# Patient Record
Sex: MALE | Employment: OTHER | URBAN - METROPOLITAN AREA
[De-identification: names, ages, dates, MRNs, and addresses within clinical notes are randomized per-mention and may not be internally consistent; named-entity substitution may affect disease eponyms.]

---

## 2019-03-11 ENCOUNTER — OFFICE VISIT - HEALTHEAST (OUTPATIENT)
Dept: FAMILY MEDICINE | Facility: CLINIC | Age: 69
End: 2019-03-11

## 2019-03-11 ENCOUNTER — AMBULATORY - HEALTHEAST (OUTPATIENT)
Dept: PEDIATRICS | Facility: CLINIC | Age: 69
End: 2019-03-11

## 2019-03-11 DIAGNOSIS — M19.90 ARTHRITIS: ICD-10-CM

## 2019-03-11 DIAGNOSIS — Z12.5 SCREENING FOR MALIGNANT NEOPLASM OF PROSTATE: ICD-10-CM

## 2019-03-11 DIAGNOSIS — Z00.00 ROUTINE GENERAL MEDICAL EXAMINATION AT A HEALTH CARE FACILITY: ICD-10-CM

## 2019-03-11 DIAGNOSIS — Z13.220 ENCOUNTER FOR SCREENING FOR LIPOID DISORDERS: ICD-10-CM

## 2019-03-11 LAB
ANION GAP SERPL CALCULATED.3IONS-SCNC: 11 MMOL/L (ref 5–18)
BUN SERPL-MCNC: 21 MG/DL (ref 8–28)
CALCIUM SERPL-MCNC: 9.5 MG/DL (ref 8.5–10.5)
CHLORIDE BLD-SCNC: 108 MMOL/L (ref 98–107)
CHOLEST SERPL-MCNC: 169 MG/DL
CO2 SERPL-SCNC: 22 MMOL/L (ref 22–31)
CREAT SERPL-MCNC: 0.82 MG/DL (ref 0.7–1.3)
FASTING STATUS PATIENT QL REPORTED: YES
GFR SERPL CREATININE-BSD FRML MDRD: >60 ML/MIN/1.73M2
GLUCOSE BLD-MCNC: 104 MG/DL (ref 70–125)
HDLC SERPL-MCNC: 60 MG/DL
HGB BLD-MCNC: 15 G/DL (ref 14–18)
LDLC SERPL CALC-MCNC: 83 MG/DL
POTASSIUM BLD-SCNC: 4.1 MMOL/L (ref 3.5–5)
PSA SERPL-MCNC: 1.5 NG/ML (ref 0–6.5)
SODIUM SERPL-SCNC: 141 MMOL/L (ref 136–145)
TRIGL SERPL-MCNC: 129 MG/DL

## 2019-03-11 ASSESSMENT — MIFFLIN-ST. JEOR: SCORE: 1653.47

## 2019-03-12 ENCOUNTER — COMMUNICATION - HEALTHEAST (OUTPATIENT)
Dept: PEDIATRICS | Facility: CLINIC | Age: 69
End: 2019-03-12

## 2019-04-01 ENCOUNTER — OFFICE VISIT - HEALTHEAST (OUTPATIENT)
Dept: FAMILY MEDICINE | Facility: CLINIC | Age: 69
End: 2019-04-01

## 2019-04-01 DIAGNOSIS — R05.9 COUGH: ICD-10-CM

## 2019-04-01 DIAGNOSIS — Z12.11 SCREEN FOR COLON CANCER: ICD-10-CM

## 2019-04-01 DIAGNOSIS — E78.5 HYPERLIPIDEMIA LDL GOAL <160: ICD-10-CM

## 2019-08-12 ENCOUNTER — COMMUNICATION - HEALTHEAST (OUTPATIENT)
Dept: FAMILY MEDICINE | Facility: CLINIC | Age: 69
End: 2019-08-12

## 2019-11-01 ENCOUNTER — COMMUNICATION - HEALTHEAST (OUTPATIENT)
Dept: FAMILY MEDICINE | Facility: CLINIC | Age: 69
End: 2019-11-01

## 2019-11-01 DIAGNOSIS — R05.9 COUGH: ICD-10-CM

## 2020-01-25 ENCOUNTER — COMMUNICATION - HEALTHEAST (OUTPATIENT)
Dept: FAMILY MEDICINE | Facility: CLINIC | Age: 70
End: 2020-01-25

## 2020-01-25 DIAGNOSIS — R05.9 COUGH: ICD-10-CM

## 2020-05-18 ENCOUNTER — COMMUNICATION - HEALTHEAST (OUTPATIENT)
Dept: FAMILY MEDICINE | Facility: CLINIC | Age: 70
End: 2020-05-18

## 2020-05-18 DIAGNOSIS — E78.5 HYPERLIPIDEMIA LDL GOAL <160: ICD-10-CM

## 2020-05-21 ENCOUNTER — COMMUNICATION - HEALTHEAST (OUTPATIENT)
Dept: FAMILY MEDICINE | Facility: CLINIC | Age: 70
End: 2020-05-21

## 2020-06-14 ENCOUNTER — COMMUNICATION - HEALTHEAST (OUTPATIENT)
Dept: FAMILY MEDICINE | Facility: CLINIC | Age: 70
End: 2020-06-14

## 2020-06-14 DIAGNOSIS — E78.5 HYPERLIPIDEMIA LDL GOAL <160: ICD-10-CM

## 2020-06-22 ENCOUNTER — OFFICE VISIT - HEALTHEAST (OUTPATIENT)
Dept: FAMILY MEDICINE | Facility: CLINIC | Age: 70
End: 2020-06-22

## 2020-06-22 DIAGNOSIS — R51.9 PRESSURE IN HEAD: ICD-10-CM

## 2020-06-22 DIAGNOSIS — E53.8 VITAMIN B12 DEFICIENCY (NON ANEMIC): ICD-10-CM

## 2020-06-22 DIAGNOSIS — E55.9 VITAMIN D INSUFFICIENCY: ICD-10-CM

## 2020-06-22 LAB
ANION GAP SERPL CALCULATED.3IONS-SCNC: 12 MMOL/L (ref 5–18)
BUN SERPL-MCNC: 17 MG/DL (ref 8–28)
CALCIUM SERPL-MCNC: 9.4 MG/DL (ref 8.5–10.5)
CHLORIDE BLD-SCNC: 108 MMOL/L (ref 98–107)
CO2 SERPL-SCNC: 22 MMOL/L (ref 22–31)
CREAT SERPL-MCNC: 0.91 MG/DL (ref 0.7–1.3)
GFR SERPL CREATININE-BSD FRML MDRD: >60 ML/MIN/1.73M2
GLUCOSE BLD-MCNC: 145 MG/DL (ref 70–125)
HGB BLD-MCNC: 14.6 G/DL (ref 14–18)
POTASSIUM BLD-SCNC: 4 MMOL/L (ref 3.5–5)
SODIUM SERPL-SCNC: 142 MMOL/L (ref 136–145)
VIT B12 SERPL-MCNC: 202 PG/ML (ref 213–816)

## 2020-06-23 LAB — 25(OH)D3 SERPL-MCNC: 21.1 NG/ML (ref 30–80)

## 2020-06-25 ENCOUNTER — COMMUNICATION - HEALTHEAST (OUTPATIENT)
Dept: FAMILY MEDICINE | Facility: CLINIC | Age: 70
End: 2020-06-25

## 2020-10-10 ENCOUNTER — COMMUNICATION - HEALTHEAST (OUTPATIENT)
Dept: FAMILY MEDICINE | Facility: CLINIC | Age: 70
End: 2020-10-10

## 2020-10-10 DIAGNOSIS — E53.8 VITAMIN B12 DEFICIENCY (NON ANEMIC): ICD-10-CM

## 2021-01-04 ENCOUNTER — COMMUNICATION - HEALTHEAST (OUTPATIENT)
Dept: FAMILY MEDICINE | Facility: CLINIC | Age: 71
End: 2021-01-04

## 2021-01-05 ENCOUNTER — COMMUNICATION - HEALTHEAST (OUTPATIENT)
Dept: SCHEDULING | Facility: CLINIC | Age: 71
End: 2021-01-05

## 2021-01-05 DIAGNOSIS — E78.5 HYPERLIPIDEMIA LDL GOAL <160: ICD-10-CM

## 2021-01-05 RX ORDER — PRAVASTATIN SODIUM 40 MG
40 TABLET ORAL DAILY
Qty: 90 TABLET | Refills: 1 | Status: SHIPPED | OUTPATIENT
Start: 2021-01-05 | End: 2021-07-18

## 2021-01-06 ENCOUNTER — OFFICE VISIT - HEALTHEAST (OUTPATIENT)
Dept: FAMILY MEDICINE | Facility: CLINIC | Age: 71
End: 2021-01-06

## 2021-01-06 DIAGNOSIS — Z12.11 SCREEN FOR COLON CANCER: ICD-10-CM

## 2021-01-06 DIAGNOSIS — E53.8 VITAMIN B12 DEFICIENCY (NON ANEMIC): ICD-10-CM

## 2021-01-06 DIAGNOSIS — E55.9 VITAMIN D INSUFFICIENCY: ICD-10-CM

## 2021-01-06 DIAGNOSIS — M17.10 ARTHRITIS OF KNEE: ICD-10-CM

## 2021-01-06 DIAGNOSIS — I10 BENIGN ESSENTIAL HYPERTENSION: ICD-10-CM

## 2021-01-20 ENCOUNTER — OFFICE VISIT - HEALTHEAST (OUTPATIENT)
Dept: FAMILY MEDICINE | Facility: CLINIC | Age: 71
End: 2021-01-20

## 2021-01-20 DIAGNOSIS — N52.9 ERECTILE DYSFUNCTION, UNSPECIFIED ERECTILE DYSFUNCTION TYPE: ICD-10-CM

## 2021-01-20 DIAGNOSIS — I10 BENIGN ESSENTIAL HYPERTENSION: ICD-10-CM

## 2021-01-20 DIAGNOSIS — Z12.11 SCREEN FOR COLON CANCER: ICD-10-CM

## 2021-01-27 ENCOUNTER — COMMUNICATION - HEALTHEAST (OUTPATIENT)
Dept: FAMILY MEDICINE | Facility: CLINIC | Age: 71
End: 2021-01-27

## 2021-01-27 DIAGNOSIS — I10 BENIGN ESSENTIAL HYPERTENSION: ICD-10-CM

## 2021-02-11 ENCOUNTER — COMMUNICATION - HEALTHEAST (OUTPATIENT)
Dept: FAMILY MEDICINE | Facility: CLINIC | Age: 71
End: 2021-02-11

## 2021-02-11 DIAGNOSIS — I10 BENIGN ESSENTIAL HYPERTENSION: ICD-10-CM

## 2021-02-11 RX ORDER — SILDENAFIL CITRATE 20 MG/1
20-40 TABLET ORAL DAILY
Qty: 180 TABLET | Refills: 3 | Status: SHIPPED | OUTPATIENT
Start: 2021-02-11

## 2021-02-17 ENCOUNTER — OFFICE VISIT - HEALTHEAST (OUTPATIENT)
Dept: FAMILY MEDICINE | Facility: CLINIC | Age: 71
End: 2021-02-17

## 2021-02-17 DIAGNOSIS — I10 BENIGN ESSENTIAL HYPERTENSION: ICD-10-CM

## 2021-02-17 DIAGNOSIS — Z12.11 SCREEN FOR COLON CANCER: ICD-10-CM

## 2021-02-17 RX ORDER — LOSARTAN POTASSIUM 50 MG/1
50 TABLET ORAL DAILY
Qty: 90 TABLET | Refills: 1 | Status: SHIPPED | OUTPATIENT
Start: 2021-02-17

## 2021-04-26 ENCOUNTER — AMBULATORY - HEALTHEAST (OUTPATIENT)
Dept: MULTI SPECIALTY CLINIC | Facility: CLINIC | Age: 71
End: 2021-04-26

## 2021-04-26 LAB — COLOGUARD-ABSTRACT: POSITIVE

## 2021-04-30 ENCOUNTER — COMMUNICATION - HEALTHEAST (OUTPATIENT)
Dept: ADMINISTRATIVE | Facility: CLINIC | Age: 71
End: 2021-04-30

## 2021-05-19 ENCOUNTER — RECORDS - HEALTHEAST (OUTPATIENT)
Dept: ADMINISTRATIVE | Facility: OTHER | Age: 71
End: 2021-05-19

## 2021-05-26 ENCOUNTER — RECORDS - HEALTHEAST (OUTPATIENT)
Dept: ADMINISTRATIVE | Facility: CLINIC | Age: 71
End: 2021-05-26

## 2021-05-27 NOTE — PROGRESS NOTES
Assessment:   1. Cough  Likely secondary to viral upper respiratory infection.   - benzonatate (TESSALON PERLES) 100 MG capsule; Take 1 capsule (100 mg total) by mouth every 6 (six) hours as needed for cough.  Dispense: 30 capsule; Refill: 0  - loratadine (CLARITIN) 10 mg tablet; Take 1 tablet (10 mg total) by mouth daily.  Dispense: 30 tablet; Refill: 2    2. Hyperlipidemia LDL goal <160  - pravastatin (PRAVACHOL) 40 MG tablet; Take 1 tablet (40 mg total) by mouth daily.  Dispense: 90 tablet; Refill: 3    3. Screen for colon cancer  - Ambulatory referral for Colonoscopy     Plan:      Explained lack of efficacy of antibiotics in viral disease.  Antitussives per medication orders.  Avoid exposure to tobacco smoke and fumes.  B-agonist inhaler.  Call if shortness of breath worsens, blood in sputum, change in character of cough, development of fever or chills, inability to maintain nutrition and hydration. Avoid exposure to tobacco smoke and fumes.  Trial of antihistamines.     Subjective:   Myron Pierce is a 68 y.o. male here for evaluation of a cough. Onset of symptoms was 3 weeks ago. Symptoms have been unchanged since that time. The cough is dry and is aggravated by cold air and reclining position. Associated symptoms include: no other symptoms. Patient does not have a history of asthma. Patient does not have a history of environmental allergens. Patient has not traveled recently. Patient does not have a history of smoking. Patient has not had a previous chest x-ray. Patient has not had a PPD done.    The following portions of the patient's history were reviewed and updated as appropriate: allergies, current medications, past family history, past medical history, past social history, past surgical history and problem list.    Review of Systems  Pertinent items are noted in HPI.      Objective:   Oxygen saturation 95% on room air  /82   Pulse 76   Temp 97.7  F (36.5  C)   Wt 209 lb (94.8 kg)   SpO2  95%   BMI 31.78 kg/m    General appearance: alert, appears stated age and cooperative  Head: Normocephalic, without obvious abnormality, atraumatic  Eyes: conjunctivae/corneas clear. PERRL, EOM's intact. Fundi benign.  Ears: normal TM's and external ear canals both ears  Nose: Nares normal. Septum midline. Mucosa normal. No drainage or sinus tenderness.  Throat: lips, mucosa, and tongue normal; teeth and gums normal  Neck: no adenopathy, no carotid bruit, no JVD, supple, symmetrical, trachea midline and thyroid not enlarged, symmetric, no tenderness/mass/nodules  Lungs: clear to auscultation bilaterally  Heart: regular rate and rhythm, S1, S2 normal, no murmur, click, rub or gallop  Pulses: 2+ and symmetric  Skin: Skin color, texture, turgor normal. No rashes or lesions  Lymph nodes: Cervical, supraclavicular, and axillary nodes normal.  Neurologic: Grossly normal

## 2021-06-02 VITALS — BODY MASS INDEX: 31.78 KG/M2 | WEIGHT: 209 LBS

## 2021-06-02 VITALS — HEIGHT: 68 IN | WEIGHT: 204.8 LBS | BODY MASS INDEX: 31.04 KG/M2

## 2021-06-02 NOTE — TELEPHONE ENCOUNTER
RN cannot approve Refill Request: benzonatate (TESSALON) 100 MG capsule    RN can NOT refill this medication med is not covered by policy/route to provider. Last office visit: 4/1/2019 Louise Penny FNP Last Physical: 3/11/2019 Last MTM visit: Visit date not found Last visit same specialty: 4/1/2019 Louise Penny FNP.  Next visit within 3 mo: Visit date not found  Next physical within 3 mo: Visit date not found    Refill Approved: loratadine (CLARITIN) 10 mg tablet    Rx renewed per Medication Renewal Policy. Medication was last renewed on 4/1/19.    Bettye Mckeon, [facility] Triage/Med Refill 11/2/2019     Requested Prescriptions   Pending Prescriptions Disp Refills     benzonatate (TESSALON) 100 MG capsule [Pharmacy Med Name: BENZONATATE 100 MG CAPSULE] 30 capsule 0     Sig: TAKE 1 CAPSULE (100 MG TOTAL) BY MOUTH EVERY 6 (SIX) HOURS AS NEEDED FOR COUGH.       There is no refill protocol information for this order        loratadine (CLARITIN) 10 mg tablet [Pharmacy Med Name: LORATADINE 10 MG TABLET] 30 tablet 2     Sig: TAKE 1 TABLET BY MOUTH EVERY DAY       Antihistamine Refill Protocol Passed - 11/1/2019  5:51 PM        Passed - Patient has had office visit/physical in last year     Last office visit with prescriber/PCP: 4/1/2019 Louise Penny FNP OR same dept: 4/1/2019 Louise Penny FNP OR same specialty: 4/1/2019 Louise Penny FNP  Last physical: 3/11/2019 Last MTM visit: Visit date not found   Next visit within 3 mo: Visit date not found  Next physical within 3 mo: Visit date not found  Prescriber OR PCP: REBECCA Corey  Last diagnosis associated with med order: 1. Cough  - benzonatate (TESSALON) 100 MG capsule [Pharmacy Med Name: BENZONATATE 100 MG CAPSULE]; Take 1 capsule (100 mg total) by mouth every 6 (six) hours as needed for cough.  Dispense: 30 capsule; Refill: 0  - loratadine (CLARITIN) 10 mg tablet [Pharmacy Med Name: LORATADINE 10 MG TABLET]; TAKE 1 TABLET BY  MOUTH EVERY DAY  Dispense: 30 tablet; Refill: 2    If protocol passes may refill for 12 months if within 3 months of last provider visit (or a total of 15 months).

## 2021-06-04 VITALS
HEART RATE: 80 BPM | DIASTOLIC BLOOD PRESSURE: 68 MMHG | WEIGHT: 210.3 LBS | SYSTOLIC BLOOD PRESSURE: 106 MMHG | BODY MASS INDEX: 31.98 KG/M2

## 2021-06-05 VITALS
SYSTOLIC BLOOD PRESSURE: 134 MMHG | DIASTOLIC BLOOD PRESSURE: 79 MMHG | WEIGHT: 209.31 LBS | OXYGEN SATURATION: 96 % | HEART RATE: 72 BPM | BODY MASS INDEX: 31.83 KG/M2

## 2021-06-05 VITALS
DIASTOLIC BLOOD PRESSURE: 79 MMHG | HEART RATE: 79 BPM | WEIGHT: 207.3 LBS | SYSTOLIC BLOOD PRESSURE: 124 MMHG | BODY MASS INDEX: 31.52 KG/M2

## 2021-06-05 VITALS
BODY MASS INDEX: 31.88 KG/M2 | SYSTOLIC BLOOD PRESSURE: 132 MMHG | WEIGHT: 209.7 LBS | DIASTOLIC BLOOD PRESSURE: 76 MMHG | HEART RATE: 78 BPM

## 2021-06-05 NOTE — TELEPHONE ENCOUNTER
Refill Approved    Rx renewed per Medication Renewal Policy. Medication was last renewed on 11/2/19.    Peg Barbosa, [facility] Triage/Med Refill 1/25/2020     Requested Prescriptions   Pending Prescriptions Disp Refills     loratadine (CLARITIN) 10 mg tablet [Pharmacy Med Name: LORATADINE 10 MG TABLET] 90 tablet 0     Sig: TAKE 1 TABLET BY MOUTH EVERY DAY       Antihistamine Refill Protocol Passed - 1/25/2020 12:59 AM        Passed - Patient has had office visit/physical in last year     Last office visit with prescriber/PCP: 4/1/2019 Louise Penny FNP OR same dept: 4/1/2019 Louise Penny FNP OR same specialty: 4/1/2019 Louise Penny FNP  Last physical: 3/11/2019 Last MTM visit: Visit date not found   Next visit within 3 mo: Visit date not found  Next physical within 3 mo: Visit date not found  Prescriber OR PCP: REBECCA Corey  Last diagnosis associated with med order: 1. Cough  - loratadine (CLARITIN) 10 mg tablet [Pharmacy Med Name: LORATADINE 10 MG TABLET]; TAKE 1 TABLET BY MOUTH EVERY DAY  Dispense: 90 tablet; Refill: 0    If protocol passes may refill for 12 months if within 3 months of last provider visit (or a total of 15 months).

## 2021-06-08 NOTE — TELEPHONE ENCOUNTER
RN cannot approve Refill Request    RN can NOT refill this medication PCP messaged that patient is overdue for Office Visit. Last office visit: 4/1/2019 Louise Penny FNP Last Physical: 3/11/2019 Last MTM visit: Visit date not found Last visit same specialty: 4/1/2019 Louise Penny FNP.  Next visit within 3 mo: Visit date not found  Next physical within 3 mo: Visit date not found      Lissett Wood, [facility] Triage/Med Refill 5/20/2020    Requested Prescriptions   Pending Prescriptions Disp Refills     pravastatin (PRAVACHOL) 40 MG tablet [Pharmacy Med Name: PRAVASTATIN SODIUM 40 MG TAB] 90 tablet 3     Sig: TAKE 1 TABLET BY MOUTH EVERY DAY       Statins Refill Protocol (Hmg CoA Reductase Inhibitors) Failed - 5/18/2020 12:30 AM        Failed - PCP or prescribing provider visit in past 12 months      Last office visit with prescriber/PCP: 4/1/2019 Louise Penny FNP OR same dept: Visit date not found OR same specialty: 4/1/2019 Louise Penny FNP  Last physical: 3/11/2019 Last MTM visit: Visit date not found   Next visit within 3 mo: Visit date not found  Next physical within 3 mo: Visit date not found  Prescriber OR PCP: REBECCA Corey  Last diagnosis associated with med order: 1. Hyperlipidemia LDL goal <160  - pravastatin (PRAVACHOL) 40 MG tablet [Pharmacy Med Name: PRAVASTATIN SODIUM 40 MG TAB]; TAKE 1 TABLET BY MOUTH EVERY DAY  Dispense: 90 tablet; Refill: 3    If protocol passes may refill for 12 months if within 3 months of last provider visit (or a total of 15 months).

## 2021-06-08 NOTE — TELEPHONE ENCOUNTER
RN cannot approve Refill Request    RN can NOT refill this medication Protocol failed and NO refill given.       Acacia Calderon, [facility] Triage/Med Refill 6/15/2020    Requested Prescriptions   Pending Prescriptions Disp Refills     pravastatin (PRAVACHOL) 40 MG tablet [Pharmacy Med Name: PRAVASTATIN SODIUM 40 MG TAB] 90 tablet 3     Sig: TAKE 1 TABLET BY MOUTH EVERY DAY       Statins Refill Protocol (Hmg CoA Reductase Inhibitors) Failed - 6/14/2020  8:31 AM        Failed - PCP or prescribing provider visit in past 12 months      Last office visit with prescriber/PCP: Visit date not found OR same dept: Visit date not found OR same specialty: 4/1/2019 Louise Penny, FNP  Last physical: Visit date not found Last MTM visit: Visit date not found   Next visit within 3 mo: Visit date not found  Next physical within 3 mo: Visit date not found  Prescriber OR PCP: Igor Coello MD  Last diagnosis associated with med order: 1. Hyperlipidemia LDL goal <160  - pravastatin (PRAVACHOL) 40 MG tablet [Pharmacy Med Name: PRAVASTATIN SODIUM 40 MG TAB]; TAKE 1 TABLET BY MOUTH EVERY DAY  Dispense: 30 tablet; Refill: 0    If protocol passes may refill for 12 months if within 3 months of last provider visit (or a total of 15 months).

## 2021-06-09 NOTE — PROGRESS NOTES
Office Visit:   Myron Pierce   69 y.o. male    Date of Visit: 6/22/2020    Chief Complaint   Patient presents with     head pain     left lower, will go to the right side, not a headache        Assessment and Plan   1. Pressure in head  Close diagnosis and treatment plan with patient.  Recommended patient use warm compresses 2 times daily acetaminophen 1000 mg maximum of 2000 a day.  Recommend evaluating vitamin B12 level, vitamin D level, hemoglobin and basic metabolic profile.  Answered patient's questions.  Patient verbalized understanding.  - Vitamin B12  - Hemoglobin  - Basic Metabolic Panel    2. Vitamin D insufficiency  - Vitamin D, Total (25-Hydroxy)    The following high BMI interventions were performed this visit: encouragement to exercise and dietary management education, guidance, and counseling    No follow-ups on file.     History of Present Illness   This 69 y.o. old male patient with a history of osteoarthritis of the knee presented to clinic today with complaint of pressure at the back of his head.  He states the pain is irregular.  He states that he usually get the pain when he is stressed or worried.  He also states that he gets the pain when he does not get his usual 8 hours of sleep at night.  He denied any hearing difficulties or ear pain.  He denied any fainting or dizziness.  He denied any dizziness with change in position.    Review of Systems: A comprehensive review of systems was negative except as noted.     Medications, Allergies and Problem List   Reviewed and updated     Physical Exam   General Appearance:   Well groomed    /68   Pulse 80   Wt 210 lb 4.8 oz (95.4 kg)   BMI 31.98 kg/m    General appearance: alert, appears stated age and cooperative  Head: Normocephalic, without obvious abnormality, atraumatic  Eyes: conjunctivae/corneas clear. PERRL, EOM's intact. Fundi benign.  Ears: normal TM's and external ear canals both ears  Throat: lips, mucosa, and tongue normal;  teeth and gums normal  Lungs: clear to auscultation bilaterally  Heart: regular rate and rhythm, S1, S2 normal, no murmur, click, rub or gallop  Pulses: 2+ and symmetric  Skin: Skin color, texture, turgor normal. No rashes or lesions  Lymph nodes: Cervical, supraclavicular, and axillary nodes normal.  Neurologic: Grossly normal       Additional Information   Current Outpatient Medications   Medication Sig Dispense Refill     benzonatate (TESSALON) 100 MG capsule TAKE 1 CAPSULE (100 MG TOTAL) BY MOUTH EVERY 6 (SIX) HOURS AS NEEDED FOR COUGH. 30 capsule 0     cholecalciferol, vitamin D3, 400 unit Tab Take 400 Units by mouth daily.       loratadine (CLARITIN) 10 mg tablet Take 1 tablet (10 mg total) by mouth daily. 90 tablet 0     pravastatin (PRAVACHOL) 40 MG tablet TAKE 1 TABLET BY MOUTH EVERY DAY 90 tablet 3     No current facility-administered medications for this visit.      No Known Allergies  Social History     Tobacco Use     Smoking status: Never Smoker     Smokeless tobacco: Never Used   Substance Use Topics     Alcohol use: Yes     Alcohol/week: 6.0 standard drinks     Types: 6 Cans of beer per week     Drug use: No          Promise LAURENCE Penny, CNP

## 2021-06-12 NOTE — TELEPHONE ENCOUNTER
RN cannot approve Refill Request    RN can NOT refill this medication Protocol failed and NO refill given. Last office visit: 6/22/2020 Louise Penny FNP Last Physical: 3/11/2019 Last MTM visit: Visit date not found Last visit same specialty: 6/22/2020 Louise Penny FNP.  Next visit within 3 mo: Visit date not found  Next physical within 3 mo: Visit date not found      Acacia Calderon, [facility] Triage/Med Refill 10/12/2020    Requested Prescriptions   Pending Prescriptions Disp Refills     VITAMIN B-12 250 MCG tablet [Pharmacy Med Name: VITAMIN B-12 250 MCG TABLET] 60 tablet 3     Sig: TAKE 2 TABLETS (500 MCG TOTAL) BY MOUTH DAILY.       Cyanocobalamin (Vitamin B12)  Refill Protocol Failed - 10/10/2020  1:30 PM        Failed - CBC in last 12 months     Hemoglobin   Date Value Ref Range Status   06/22/2020 14.6 14.0 - 18.0 g/dL Final                Passed - PCP or prescribing provider visit in past 12 months       Last office visit with prescriber/PCP: 6/22/2020 Louise Penny FNP OR same dept: 6/22/2020 Louise Penny FNP OR same specialty: 6/22/2020 Louise Penny FNP Last physical: 3/11/2019 Last MTM visit: Visit date not found    Next visit within 3 mo: Visit date not found  Next physical within 3 mo: Visit date not found  Prescriber OR PCP: REBECCA Corey  Last diagnosis associated with med order: 1. Vitamin B12 deficiency (non anemic)  - VITAMIN B-12 250 MCG tablet [Pharmacy Med Name: VITAMIN B-12 250 MCG TABLET]; TAKE 2 TABLETS (500 MCG TOTAL) BY MOUTH DAILY.  Dispense: 60 tablet; Refill: 3               Passed - Vitamin B12 level in last 12 months     Vitamin B-12   Date Value Ref Range Status   06/22/2020 202 (L) 213 - 816 pg/mL Final

## 2021-06-14 NOTE — TELEPHONE ENCOUNTER
Refill Approved    Rx renewed per Medication Renewal Policy. Medication was last renewed on 6/16/2020.  6/22/2020 Last office visit    J[facility] Triage/Med Refill 1/5/2021     Requested Prescriptions   Pending Prescriptions Disp Refills     pravastatin (PRAVACHOL) 40 MG tablet 90 tablet 3     Sig: Take 1 tablet (40 mg total) by mouth daily.       Statins Refill Protocol (Hmg CoA Reductase Inhibitors) Passed - 1/5/2021 10:57 AM        Passed - PCP or prescribing provider visit in past 12 months      Last office visit with prescriber/PCP: Visit date not found OR same dept: Visit date not found OR same specialty: Visit date not found  Last physical: Visit date not found Last MTM visit: Visit date not found   Next visit within 3 mo: Visit date not found  Next physical within 3 mo: Visit date not found  Prescriber OR PCP: Domonique Mason RN  Last diagnosis associated with med order: 1. Hyperlipidemia LDL goal <160  - pravastatin (PRAVACHOL) 40 MG tablet; Take 1 tablet (40 mg total) by mouth daily.  Dispense: 90 tablet; Refill: 3    If protocol passes may refill for 12 months if within 3 months of last provider visit (or a total of 15 months).

## 2021-06-14 NOTE — PROGRESS NOTES
Assessment/Plan:   1. Benign essential hypertension  Discussed diagnosis and treatment plan. Recommend that patient take medication as prescribed and follow up in two weeks.    Dietary sodium restriction.  Regular aerobic exercise.  Check blood pressures 2 times daily and record.   - losartan (COZAAR) 25 MG tablet; Take 1 tablet (25 mg total) by mouth daily.  Dispense: 30 tablet; Refill: 0    2. Arthritis of knee  Discussed possible diagnosis and need for further evaluation. Referral placed for orthopedics  - Ambulatory referral to Orthopedics    3. Screen for colon cancer  - Ambulatory referral for Colonoscopy    4. Vitamin B12 deficiency (non anemic)  Restart Vitamin B12  - cyanocobalamin (VITAMIN B-12) 250 MCG tablet; Take 1 tablet (250 mcg total) by mouth daily.  Dispense: 90 tablet; Refill: 3    5. Vitamin D insufficiency  Restart Vitamin D and recheck in 6 weeks  - cholecalciferol, vitamin D3, 125 mcg (5,000 unit) capsule; Take 5 capsules (25,000 Units total) by mouth once a week. Take 5 capsules weekly  Dispense: 90 capsule; Refill: 3    Subjective:   Patient here for follow-up of elevated blood pressure.  He is concerned of behind the ear headaches that comes when he is stressed or exerting himself mentally. He reports that he does not experience the headache when he is relaxed or doing something fun. He also reports that he has noted elevated blood pressure at home. He reports that he attempts to exercise.  He adherent to a low-salt diet.  Blood pressure is not well controlled at home. Cardiac symptoms: none. Patient denies: chest pain, dyspnea, exertional chest pressure/discomfort, fatigue, irregular heart beat, lower extremity edema, orthopnea, paroxysmal nocturnal dyspnea and syncope. Cardiovascular risk factors: advanced age (older than 55 for men, 65 for women), dyslipidemia, hypertension and male gender. Use of agents associated with hypertension: none. History of target organ damage: none.      Patient reported that both of his knees is bothering him but especially the left knee.  He stated that it gives out on him most of the time and it looks inflamed.  He stated that its difficult for him to climb the stairs and to complete his ADLs.  Patient denied any recent falls or injuries.  Patient rates pain at a 6-7 scale of 1-10.  Patient would like to discuss further treatment for his knee pain.      25 minutes spent on the date of the encounter doing chart review, history and exam, documentation and further activities as noted above    The following portions of the patient's history were reviewed and updated as appropriate: allergies, current medications, past family history, past medical history, past social history, past surgical history and problem list.    Review of Systems  A 12 point comprehensive review of systems was negative except as noted.        Objective:   /79   Pulse 72   Wt 209 lb 5 oz (94.9 kg)   SpO2 96%   BMI 31.83 kg/m    General appearance: alert, appears stated age and cooperative  Head: Normocephalic, without obvious abnormality, atraumatic  Neck: no adenopathy, no carotid bruit, no JVD, supple, symmetrical, trachea midline and thyroid not enlarged, symmetric, no tenderness/mass/nodules  Lungs: clear to auscultation bilaterally  Heart: regular rate and rhythm, S1, S2 normal, no murmur, click, rub or gallop  Extremities: extremities normal, atraumatic, no cyanosis or edema  Pulses: 2+ and symmetric  Neurologic: Grossly normal          I spent a total of 15 minutes face-to-face with Myron Pierce during today's office visit.  Over 50% of this time was spent counseling the patient and/or coordinating care regarding elevated blood pressure and other symptoms. See note for details.

## 2021-06-14 NOTE — PROGRESS NOTES
Assessment:   1. Benign essential hypertension  Blood pressure is not meeting goal of less than 140/90 at home. Recommend increasing losartan to 50 mg daily and patient will follow up in 4 weeks  - losartan (COZAAR) 50 MG tablet; Take 1 tablet (50 mg total) by mouth daily.  Dispense: 30 tablet; Refill: 0    2. Erectile dysfunction, unspecified erectile dysfunction type  Discussed diagnosis and possible treatment. Recommend trail of sildenafil.   - sildenafil (REVATIO) 20 mg tablet; Take 1-2 tablets (20-40 mg total) by mouth daily.  Dispense: 60 tablet; Refill: 0    Plan:   Medication: increase to 50 mg.  Dietary sodium restriction.  Regular aerobic exercise.  Check blood pressures daily and record.  Follow up: 4 weeks and as needed.     Subjective:   Patient here for follow-up of elevated blood pressure.  He is exercising and is not adherent to a low-salt diet.  Blood pressure is not well controlled at home. Cardiac symptoms: none. Patient denies: chest pain, claudication, dyspnea, irregular heart beat, near-syncope, orthopnea, palpitations, syncope and tachypnea. Cardiovascular risk factors: dyslipidemia, hypertension and male gender. Use of agents associated with hypertension: none. History of target organ damage: none.  Patient did complain of sexual dysfunction.  He complains about poor erection.  Patient is still sexually active.  He denied any chest pain, shortness of breath, fever and chills.    The following portions of the patient's history were reviewed and updated as appropriate: allergies, current medications, past family history, past medical history, past social history, past surgical history and problem list.    Review of Systems  A 12 point comprehensive review of systems was negative except as noted.        Objective:   /76   Pulse 78   Wt 209 lb 11.2 oz (95.1 kg)   BMI 31.88 kg/m    General appearance: alert, appears stated age and cooperative  Head: Normocephalic, without obvious  abnormality, atraumatic  Heart: regular rate and rhythm, S1, S2 normal, no murmur, click, rub or gallop  Pulses: 2+ and symmetric

## 2021-06-15 NOTE — TELEPHONE ENCOUNTER
Refill Approved    Rx renewed per Medication Renewal Policy. Medication was last renewed on 1/20/21.    Alec Mobley, [facility] Triage/Med Refill 2/11/2021     Requested Prescriptions   Pending Prescriptions Disp Refills     losartan (COZAAR) 50 MG tablet [Pharmacy Med Name: LOSARTAN POTASSIUM 50 MG TAB] 30 tablet 0     Sig: TAKE 1 TABLET BY MOUTH EVERY DAY       Angiotensin Receptor Blocker Protocol Passed - 2/11/2021  2:26 AM        Passed - PCP or prescribing provider visit in past 12 months       Last office visit with prescriber/PCP: 1/20/2021 Louise Penny FNP OR same dept: 1/20/2021 Louise Penny FNP OR same specialty: 1/20/2021 Louise Penny FNP  Last physical: 3/11/2019 Last MTM visit: Visit date not found   Next visit within 3 mo: Visit date not found  Next physical within 3 mo: Visit date not found  Prescriber OR PCP: REBECCA Corey  Last diagnosis associated with med order: 1. Benign essential hypertension  - losartan (COZAAR) 50 MG tablet [Pharmacy Med Name: LOSARTAN POTASSIUM 50 MG TAB]; TAKE 1 TABLET BY MOUTH EVERY DAY  Dispense: 30 tablet; Refill: 0  - sildenafil (REVATIO) 20 mg tablet [Pharmacy Med Name: SILDENAFIL 20 MG TABLET]; Take 1-2 tablets (20-40 mg total) by mouth daily.  Dispense: 60 tablet; Refill: 0    If protocol passes may refill for 12 months if within 3 months of last provider visit (or a total of 15 months).             Passed - Serum potassium within the past 12 months     Lab Results   Component Value Date    Potassium 4.0 06/22/2020             Passed - Blood pressure filed in past 12 months     BP Readings from Last 1 Encounters:   01/20/21 132/76             Passed - Serum creatinine within the past 12 months     Creatinine   Date Value Ref Range Status   06/22/2020 0.91 0.70 - 1.30 mg/dL Final                sildenafil (REVATIO) 20 mg tablet [Pharmacy Med Name: SILDENAFIL 20 MG TABLET] 60 tablet 0     Sig: TAKE 1-2 TABLETS (20-40 MG TOTAL) BY  MOUTH DAILY.       Medications for Impotence Refill Protocol Passed - 2/11/2021  2:26 AM        Passed - PCP or prescribing provider visit in last year     Last office visit with prescriber/PCP: 1/20/2021 Louise Penny FNP OR same dept: 1/20/2021 Louise Penny FNP OR same specialty: 1/20/2021 Louise Penny FNP  Last physical: 3/11/2019 Last MTM visit: Visit date not found   Next visit within 3 mo: Visit date not found  Next physical within 3 mo: Visit date not found  Prescriber OR PCP: REBECCA Corey  Last diagnosis associated with med order: 1. Benign essential hypertension  - losartan (COZAAR) 50 MG tablet [Pharmacy Med Name: LOSARTAN POTASSIUM 50 MG TAB]; TAKE 1 TABLET BY MOUTH EVERY DAY  Dispense: 30 tablet; Refill: 0  - sildenafil (REVATIO) 20 mg tablet [Pharmacy Med Name: SILDENAFIL 20 MG TABLET]; Take 1-2 tablets (20-40 mg total) by mouth daily.  Dispense: 60 tablet; Refill: 0    If protocol passes may refill for 12 months if within 3 months of last provider visit (or a total of 15 months).

## 2021-06-15 NOTE — PROGRESS NOTES
Assessment:   1. Benign essential hypertension  Blood pressure is well controlled and meeting goal of <140/90 mm Hg per JNC-8 hypertension guidelines. Regarding the neck and head discomfort, I recommend that patient continue his blood pressure medication and follow up in 4 weeks if symptoms persist.   - losartan (COZAAR) 50 MG tablet; Take 1 tablet (50 mg total) by mouth daily.  Dispense: 90 tablet; Refill: 1    2. Screen for colon cancer  - Cologuard     Subjective:   Patient here for follow-up of elevated blood pressure.  He is exercising and is adherent to a low-salt diet.  Blood pressure is well controlled at home. Cardiac symptoms: none. Patient denies: chest pain, chest pressure/discomfort, claudication, dyspnea, exertional chest pressure/discomfort, fatigue, irregular heart beat, lower extremity edema, orthopnea, palpitations, syncope and tachypnea. Cardiovascular risk factors: advanced age (older than 55 for men, 65 for women), hypertension, male gender and obesity (BMI >= 30 kg/m2). Use of agents associated with hypertension: none. History of target organ damage: none.    The following portions of the patient's history were reviewed and updated as appropriate: allergies, current medications, past family history, past medical history, past social history, past surgical history and problem list.    Review of Systems  A 12 point comprehensive review of systems was negative except as noted.        Objective:   Pulses: 2+ and symmetric  Skin: Skin color, texture, turgor normal. No rashes or lesions        16 minutes spent on the date of the encounter doing chart review, patient visit and documentation        No follow-ups on file.    REBECCA Corey  [facility]

## 2021-06-16 PROBLEM — M19.90 ARTHRITIS: Status: ACTIVE | Noted: 2019-03-11

## 2021-06-17 NOTE — PATIENT INSTRUCTIONS - HE
Patient Instructions by Louise Penny FNP at 4/1/2019  1:30 PM     Author: Louise Penny FNP Service: -- Author Type: Nurse Practitioner    Filed: 4/1/2019  1:41 PM Encounter Date: 4/1/2019 Status: Signed    : Louise Penny FNP (Nurse Practitioner)       Patient Education     Adult Self-Care for Colds    Colds are caused by viruses. They can't be cured with antibiotics. However, you can ease symptoms and support your body's efforts to heal itself. No matter which symptoms you have, be sure to:    Drink plenty of fluids (water or clear soup)    Stop smoking and drinking alcohol    Get plenty of rest  Understand a fever    Take your temperature several times a day. If your fever is 100.4 F (38.0 C) for more than a day, call your healthcare provider.    Relax, lie down. Go to bed if you want. Just get off your feet and rest. Also, drink plenty of fluids to avoid dehydration.    Take acetaminophen or a nonsteroidal anti-inflammatory agent (NSAID), such as ibuprofen.  Treat a troubled nose kindly    Breathe steam or heated humidified air to open blocked nasal passages.  a hot shower or use a vaporizer. Be careful not to get burned by the steam.    Saline nasal sprays and decongestant tablets help open a stuffy nose. Antihistamines can also help, but they can cause side effects such as drowsiness and drying of the eyes, nose, and mouth.  Soothe a sore throat and cough    Gargle every 2 hours with 1/4 teaspoon of salt dissolved in 1/2 cup of warm water. Suck on throat lozenges and cough drops to moisten your throat.    Cough medicines are available but it is unclear how well they actually work.    Take acetaminophen or an NSAID, such as ibuprofen, to ease throat pain  Ease digestive problems    Put fluids back into your body. Take frequent sips of clear liquids such as water or broth. Avoid drinks that have a lot of sugar in them, such as juices and sodas. These can make diarrhea worse.  Older children and adults can drink sports drinks.    As your appetite returns, you can resume your normal diet. Ask your healthcare provider if there are any foods you should avoid.  When to seek medical care  When you first notice symptoms, ask your healthcare provider if antiviral medicines are appropriate. Antibiotics should not be taken for colds or flu. Also, call your healthcare provider if you have any of the following symptoms or if you aren't feeling better after 7 days:    Shortness of breath    Pain or pressure in the chest or belly (abdomen)    Worsening symptoms, especially after a period of improvement    Fever of 100.4 F  (38.0 C) or higher, or fever that doesn't go down with medicine    Sudden dizziness or confusion    Severe or continued vomiting    Signs of dehydration, including extreme thirst, dark urine, infrequent urination, dry mouth    Spotted, red, or very sore throat   Date Last Reviewed: 12/1/2016 2000-2017 [employer]. [address] [place] [address]. All rights reserved. This information is not intended as a substitute for professional medical care. Always follow your healthcare professional's instructions.

## 2021-06-17 NOTE — TELEPHONE ENCOUNTER
I called and spoke with provider services with [employer], and requested results be re-faxed to [facility]. Representative went ahead and re-faxed 'POSITIVE' results.   Results of yvette are in PCP's inbox for review.

## 2021-06-17 NOTE — TELEPHONE ENCOUNTER
Reason for Call:  Cologuard Results    Detailed comments: [employer] calling, faxed over yesterday an Abnormal result to [phone]. Have you received this fax? If not please call them at [phone] pick provider support, they can refax it.      Call taken on 4/30/2021 at 10:03 AM by Sara Pathak

## 2021-06-17 NOTE — PATIENT INSTRUCTIONS - HE
Patient Instructions by Louise Penny FNP at 3/11/2019 10:50 AM     Author: Louise Penny FNP Service: -- Author Type: Nurse Practitioner    Filed: 3/11/2019 11:15 AM Encounter Date: 3/11/2019 Status: Signed    : Louise Penny FNP (Nurse Practitioner)         Patient Education   Signs of Hearing Loss  Hearing loss is a problem shared by many people. In fact, it is one of the most common health conditions, particularly as people age. Most people over age 65 have some hearing loss, and by age 80, almost everyone does. Because hearing loss usually occurs slowly over the years, you may not realize your hearing ability has gotten worse.       Have your hearing checked  Contact your [facility] care provider if you:    Have to strain to hear normal conversation.    Have to watch other peoples faces very carefully to follow what theyre saying.    Need to ask people to repeat what theyve said.    Often misunderstand what people are saying.    Turn the volume of the television or radio up so high that others complain.    Feel that people are mumbling when theyre talking to you.    Find that the effort to hear leaves you feeling tired and irritated.    Notice, when using the phone, that you hear better with 1 ear than the other.    6966-9446 [employer]. [address] [place] [address], [place] [address]. All rights reserved. This information is not intended as a substitute for professional medical care. Always follow your healthcare professional's instructions.         Patient Education   Preventing Falls in the Home  As you get older, falls are more likely. Thats because your reaction time slows. Your muscles and joints may also get stiffer, making them less flexible. Illness, medications, and vision changes can also affect your balance. A fall could leave you unable to live on your own. To make your home safer, follow these tips:    Floors    Put nonskid pads under area rugs.    Remove throw  rugs.    Replace worn floor coverings.    Tack carpets firmly to each step on carpeted stairs. Put nonskid strips on the edges of uncarpeted stairs.    Keep floors and stairs free of clutter and cords.    Arrange furniture so there are clear pathways.    Clean up any spills right away.    Bathrooms    Install grab bars in the tub or shower.    Apply nonskid strips or put a nonskid rubber mat in the tub or shower.    Sit on a bath chair to bathe.    Use bathmats with nonskid backing.    Lighting    Keep a flashlight in each room.    Put a nightlight along the pathway between the bedroom and the bathroom.    2524-7890 [employer]. [address] [place] [address], [place] [address]. All rights reserved. This information is not intended as a substitute for professional medical care. Always follow your healthcare professional's instructions.           Advance Directive  Patients advance directive was discussed and I am comfortable with the patients wishes.  Patient Education   Personalized Prevention Plan  You are due for the preventive services outlined below.  Your care team is available to assist you in scheduling these services.  If you have already completed any of these items, please share that information with your care team to update in your medical record.  Health Maintenance   Topic Date Due   ? TD 18+ HE  09/29/1966   ? COLONOSCOPY  09/29/1998   ? ZOSTER VACCINES (1 of 2) 09/29/1998   ? PNEUMOCOCCAL POLYSACCHARIDE VACCINE AGE 65 AND OVER  09/29/2013   ? PNEUMOCOCCAL CONJUGATE VACCINE FOR ADULTS (PCV13 OR PREVNAR)  09/29/2013   ? FALL RISK ASSESSMENT  03/11/2020   ? ADVANCE DIRECTIVES DISCUSSED WITH PATIENT  03/11/2024   ? INFLUENZA VACCINE RULE BASED  Completed

## 2021-06-18 NOTE — LETTER
Letter by Louise Penny FNP at      Author: Louise Penny FNP Service: -- Author Type: --    Filed:  Encounter Date: 3/12/2019 Status: (Other)       Parent/guardian of Miguel S Sanchez [address] [place] [address]             March 12, 2019         Dear Myronenedina Pierce,    Below are the results from Myron's recent visit:    Resulted Orders   PSA (Prostatic-Specific Antigen), Annual Screen   Result Value Ref Range    PSA 1.5 0.0 - 6.5 ng/mL    Narrative    Method is Abbott Prostate-Specific Antigen (PSA)  Standard-WHO 1st International (90:10)   Lipid [place], FASTING   Result Value Ref Range    Cholesterol 169 <=199 mg/dL    Triglycerides 129 <=149 mg/dL    HDL Cholesterol 60 >=40 mg/dL    LDL Calculated 83 <=129 mg/dL    Patient Fasting > 8hrs? Yes    Basic Metabolic Panel   Result Value Ref Range    Sodium 141 136 - 145 mmol/L    Potassium 4.1 3.5 - 5.0 mmol/L    Chloride 108 (H) 98 - 107 mmol/L    CO2 22 22 - 31 mmol/L    Anion Gap, Calculation 11 5 - 18 mmol/L    Glucose 104 70 - 125 mg/dL    Calcium 9.5 8.5 - 10.5 mg/dL    BUN 21 8 - 28 mg/dL    Creatinine 0.82 0.70 - 1.30 mg/dL    GFR MDRD Af Amer >60 >60 mL/min/1.73m2    GFR MDRD Non Af Amer >60 >60 mL/min/1.73m2    Narrative    Fasting Glucose reference range is 70-99 mg/dL per  American Diabetes Association (ADA) guidelines.   Hemoglobin   Result Value Ref Range    Hemoglobin 15.0 14.0 - 18.0 g/dL       Normal lab results. Continue with discussed exercise and healthy diet.     Please call with questions or contact us using [employer].    Sincerely,        Electronically signed by REBECCA Corey

## 2021-06-19 NOTE — LETTER
Letter by Louise Penny FNP at      Author: Louise Penny FNP Service: -- Author Type: --    Filed:  Encounter Date: 8/12/2019 Status: (Other)         Myron Pierce  [address] [place] [address]             August 12, 2019         Dear Mr. Pierce,    Our records show that you are due for a colonoscopy.  We do want to inform you that there are a couple of other options besides the traditional colonoscopy that we offer.  If you would like to do the traditional colonoscopy, please contact a [facility] near you according to the card enclosed.  If you would like to do one of the other options available to you, please let you primary doctor know and they will get that ordered.  Enclosed is some information on the other options for you to read over.  If you have had any of these done at another facility, please arrange for us to receive those records so we can update your chart.    Please call with questions or contact us using [employer].    Sincerely,        Electronically signed by REBECCA Corey

## 2021-06-20 NOTE — LETTER
Letter by Louise Penny FNP at      Author: Louise Penny FNP Service: -- Author Type: --    Filed:  Encounter Date: 6/25/2020 Status: (Other)         Myron Pierce  [address] [place] [address]             June 25, 2020         Dear Mr. Pierce,    Below are the results from your recent visit:    Resulted Orders   Vitamin D, Total (25-Hydroxy)   Result Value Ref Range    Vitamin D, Total (25-Hydroxy) 21.1 (L) 30.0 - 80.0 ng/mL    Narrative    Deficiency <10.0 ng/mL  Insufficiency 10.0-29.9 ng/mL  Sufficiency 30.0-80.0 ng/mL  Toxicity (possible) >100.0 ng/mL   Vitamin B12   Result Value Ref Range    Vitamin B-12 202 (L) 213 - 816 pg/mL   Hemoglobin   Result Value Ref Range    Hemoglobin 14.6 14.0 - 18.0 g/dL   Basic Metabolic Panel   Result Value Ref Range    Sodium 142 136 - 145 mmol/L    Potassium 4.0 3.5 - 5.0 mmol/L    Chloride 108 (H) 98 - 107 mmol/L    CO2 22 22 - 31 mmol/L    Anion Gap, Calculation 12 5 - 18 mmol/L    Glucose 145 (H) 70 - 125 mg/dL    Calcium 9.4 8.5 - 10.5 mg/dL    BUN 17 8 - 28 mg/dL    Creatinine 0.91 0.70 - 1.30 mg/dL    GFR MDRD Af Amer >60 >60 mL/min/1.73m2    GFR MDRD Non Af Amer >60 >60 mL/min/1.73m2    Narrative    Fasting Glucose reference range is 70-99 mg/dL per  American Diabetes Association (ADA) guidelines.       Insufficient vitamin D and low vitamin B12.  Recommend patient take vitamin B12 and vitamin D supplements.  Plan to recheck levels in 12 weeks.     Please call with questions or contact us using [employer].    Sincerely,        Electronically signed by REBECCA Corey

## 2021-06-24 NOTE — PROGRESS NOTES
Assessment and Plan:   1. Routine general medical examination at a health care facility  Healthy male exam  - Ambulatory referral to PT/OT  - Basic Metabolic Panel  - Hemoglobin    2. Encounter for screening for lipoid disorders  - Lipid [place], FASTING    3. Screening for malignant neoplasm of prostate  - PSA (Prostatic-Specific Antigen), Annual Screen    4. Arthritis  Recommend regular exercise.     The patient's current medical problems were reviewed.    The following high BMI interventions were performed this visit: encouragement to exercise  The following health maintenance schedule was reviewed with the patient and provided in printed form in the after visit summary:   Health Maintenance   Topic Date Due     TD 18+ HE  09[birth year]29/1966     COLONOSCOPY  09/29/1998     ZOSTER VACCINES (1 of 2) 09/29/1998     PNEUMOCOCCAL POLYSACCHARIDE VACCINE AGE 65 AND OVER  09/29/2013     PNEUMOCOCCAL CONJUGATE VACCINE FOR ADULTS (PCV13 OR PREVNAR)  09/29/2013     FALL RISK ASSESSMENT  03/11/2020     ADVANCE DIRECTIVES DISCUSSED WITH PATIENT  03/11/2024     INFLUENZA VACCINE RULE BASED  Completed        Subjective:   Chief Complaint: Myron Pierce is an 68 y.o. male here for an Annual Wellness visit.   HPI: Patient reported that he is doing very well that his only concern today is the pain he is having on his knuckles.  Especially on his right index finger.  Report the pain comes and goes and sometimes it makes it difficult for him to hold onto something very tight.  Patient denied any injuries or accident with his fingers.  Patient has no other concerns she denies chest pain, shortness of breath, fever and chills.    Review of Systems: Please see above.  The rest of the review of systems are negative for all systems.    Patient Care Team:  Louise Penny, STACIEP as PCP - General (Nurse Practitioner)     There is no problem list on file for this patient.    No past medical history on file.   No past surgical history on file.  "  No family history on file.   Social History     Socioeconomic History     Marital status:      Spouse name: Not on file     Number of children: Not on file     Years of education: Not on file     Highest education level: Not on file   Occupational History     Not on file   Social Needs     Financial resource strain: Not on file     Food insecurity:     Worry: Not on file     Inability: Not on file     Transportation needs:     Medical: Not on file     Non-medical: Not on file   Tobacco Use     Smoking status: Never Smoker     Smokeless tobacco: Never Used   Substance and Sexual Activity     Alcohol use: Not on file     Drug use: Not on file     Sexual activity: Not on file   Lifestyle     Physical activity:     Days per week: Not on file     Minutes per session: Not on file     Stress: Not on file   Relationships     Social connections:     Talks on phone: Not on file     Gets together: Not on file     Attends [employer] service: Not on file     Active member of club or organization: Not on file     Attends meetings of clubs or organizations: Not on file     Relationship status: Not on file     Intimate partner violence:     Fear of current or ex partner: Not on file     Emotionally abused: Not on file     Physically abused: Not on file     Forced sexual activity: Not on file   Other Topics Concern     Not on file   Social History Narrative     Not on file      Current Outpatient Medications   Medication Sig Dispense Refill     cholecalciferol, vitamin D3, 400 unit Tab Take 400 Units by mouth daily.       pravastatin (PRAVACHOL) 40 MG tablet        No current facility-administered medications for this visit.       Objective:   Vital Signs:   Visit Vitals  /68   Pulse 60   Temp 97.8  F (36.6  C)   Resp 12   Ht 5' 8\" (1.727 m)   Wt 204 lb 12.8 oz (92.9 kg)   BMI 31.14 kg/m         VisionScreening:  No exam data present     PHYSICAL EXAM  /68   Pulse 60   Temp 97.8  F (36.6  C)   Resp 12   Ht 5' " "8\" (1.727 m)   Wt 204 lb 12.8 oz (92.9 kg)   BMI 31.14 kg/m    General appearance: alert, appears stated age and cooperative  Head: Normocephalic, without obvious abnormality, atraumatic  Eyes: conjunctivae/corneas clear. PERRL, EOM's intact. Fundi benign.  Ears: normal TM's and external ear canals both ears  Throat: lips, mucosa, and tongue normal; teeth and gums normal  Neck: no adenopathy, no carotid bruit, no JVD, supple, symmetrical, trachea midline and thyroid not enlarged, symmetric, no tenderness/mass/nodules  Back: symmetric, no curvature. ROM normal. No CVA tenderness.  Lungs: clear to auscultation bilaterally  Chest wall: no tenderness  Heart: regular rate and rhythm, S1, S2 normal, no murmur, click, rub or gallop  Abdomen: soft, non-tender; bowel sounds normal; no masses,  no organomegaly  Extremities: extremities normal, atraumatic, no cyanosis or edema, mild induration of the phalanges  Pulses: 2+ and symmetric  Skin: Skin color, texture, turgor normal. No rashes or lesions  Lymph nodes: Cervical, supraclavicular, and axillary nodes normal.  Neurologic: Grossly normal      Assessment Results 3/11/2019   Activities of Daily Living No help needed   Instrumental Activities of Daily Living No help needed   Mini Cog Total Score 4   Some recent data might be hidden     A Mini-Cog score of 0-2 suggests the possibility of dementia, score of 3-5 suggests no dementia    Identified Health Risks:     The patient was provided with written information regarding signs of hearing loss.  He is at risk for falling and has been provided with information to reduce the risk of falling at home.  Patient's advanced directive was discussed and I am comfortable with the patient's wishes.        "

## 2021-07-03 NOTE — ADDENDUM NOTE
Addendum Note by Louise Velasquez FNP at 6/22/2020  1:40 PM     Author: Louise Velasquez FNP Service: -- Author Type: Nurse Practitioner    Filed: 6/25/2020  4:56 PM Encounter Date: 6/22/2020 Status: Signed    : Louise Velasquez FNP (Nurse Practitioner)    Addended by: LOUISE VELASQUEZ on: 6/25/2020 04:56 PM        Modules accepted: Orders

## 2021-07-03 NOTE — ADDENDUM NOTE
Addendum Note by Louise Velasquez FNP at 6/22/2020  1:40 PM     Author: Louise Velasquez FNP Service: -- Author Type: Nurse Practitioner    Filed: 6/25/2020  9:54 AM Encounter Date: 6/22/2020 Status: Signed    : Louise Velasquez FNP (Nurse Practitioner)    Addended by: LOUISE VELASQUEZ on: 6/25/2020 09:54 AM        Modules accepted: Orders

## 2021-07-04 NOTE — ADDENDUM NOTE
Addendum Note by Louise Velasquez FNP at 2/17/2021 12:40 PM     Author: Louise Velasquez FNP Service: -- Author Type: Nurse Practitioner    Filed: 5/3/2021 10:03 AM Encounter Date: 2/17/2021 Status: Signed    : Louise Velasquez FNP (Nurse Practitioner)    Addended by: LOUISE VELASQUEZ on: 5/3/2021 10:03 AM        Modules accepted: Orders

## 2021-07-16 DIAGNOSIS — E78.5 HYPERLIPIDEMIA LDL GOAL <160: ICD-10-CM

## 2021-07-18 RX ORDER — PRAVASTATIN SODIUM 40 MG
40 TABLET ORAL DAILY
Qty: 90 TABLET | Refills: 1 | Status: SHIPPED | OUTPATIENT
Start: 2021-07-18 | End: 2021-12-30

## 2021-09-18 ENCOUNTER — VIRTUAL VISIT (OUTPATIENT)
Dept: URGENT CARE | Facility: CLINIC | Age: 71
End: 2021-09-18
Payer: COMMERCIAL

## 2021-09-18 DIAGNOSIS — R05.9 COUGH: Primary | ICD-10-CM

## 2021-09-18 PROCEDURE — 99441 PR PHYSICIAN TELEPHONE EVALUATION 5-10 MIN: CPT

## 2021-09-18 NOTE — PROGRESS NOTES
Myron is a 70 year old who is being evaluated via a billable telephone visit.      What phone number would you like to be contacted at? [phone]  How would you like to obtain your AVS? Mail a copy    Assessment & Plan     Cough    - Symptomatic COVID-19 Virus (Coronavirus) by PCR; Future      10 minutes spent on the date of the encounter doing patient visit and documentation        See Patient Instructions    No follow-ups on file.    [facility]  [facility]    Subjective   Myron is a 70 year old who presents for the following health issues     HPI     70-year-old male presents to virtual urgent care via telephone visit for Covid concern.  His symptoms began 5 days ago of headache, runny nose and cough.  No loss of taste or smell.  No shortness of breath or chest pain.  Is fully vaccinated.  No known exposure.    Review of Systems   Constitutional, HEENT, cardiovascular, pulmonary, gi and gu systems are negative, except as otherwise noted.      Objective           Vitals:  No vitals were obtained today due to virtual visit.    Physical Exam   healthy, alert and no distress  PSYCH: Alert and oriented times 3; coherent speech, normal   rate and volume, able to articulate logical thoughts, able   to abstract reason, no tangential thoughts, no hallucinations   or delusions  His affect is normal  RESP: No cough, no audible wheezing, able to talk in full sentences  Remainder of exam unable to be completed due to telephone visits            Phone call duration: 5 minutes

## 2021-09-18 NOTE — PATIENT INSTRUCTIONS
Please call [phone] to schedule your Covid test.  Test results are typically available 18 to 24 hours after you complete your test.  If your test is positive you will be called by an [facility] nurse.  Test results are always available on [employer].  Please quarantine until you know your test results.

## 2021-09-19 ENCOUNTER — LAB (OUTPATIENT)
Dept: URGENT CARE | Facility: URGENT CARE | Age: 71
End: 2021-09-19
Attending: NURSE PRACTITIONER
Payer: COMMERCIAL

## 2021-09-19 DIAGNOSIS — R05.9 COUGH: ICD-10-CM

## 2021-09-19 PROCEDURE — U0005 INFEC AGEN DETEC AMPLI PROBE: HCPCS

## 2021-09-19 PROCEDURE — U0003 INFECTIOUS AGENT DETECTION BY NUCLEIC ACID (DNA OR RNA); SEVERE ACUTE RESPIRATORY SYNDROME CORONAVIRUS 2 (SARS-COV-2) (CORONAVIRUS DISEASE [COVID-19]), AMPLIFIED PROBE TECHNIQUE, MAKING USE OF HIGH THROUGHPUT TECHNOLOGIES AS DESCRIBED BY CMS-2020-01-R: HCPCS

## 2021-09-20 LAB — SARS-COV-2 RNA RESP QL NAA+PROBE: NEGATIVE

## 2021-12-30 DIAGNOSIS — E78.5 HYPERLIPIDEMIA LDL GOAL <160: ICD-10-CM

## 2021-12-30 RX ORDER — PRAVASTATIN SODIUM 40 MG
40 TABLET ORAL DAILY
Qty: 90 TABLET | Refills: 0 | Status: SHIPPED | OUTPATIENT
Start: 2021-12-30

## 2021-12-30 NOTE — TELEPHONE ENCOUNTER
Due for recheck with labs. Please call patient to schedule annual medicare visit when able. Sent in Prescription.     Macario Sosa MD

## 2023-07-12 ENCOUNTER — TRANSFERRED RECORDS (OUTPATIENT)
Dept: HEALTH INFORMATION MANAGEMENT | Facility: CLINIC | Age: 73
End: 2023-07-12
Payer: COMMERCIAL

## 2024-05-29 ENCOUNTER — TRANSFERRED RECORDS (OUTPATIENT)
Dept: HEALTH INFORMATION MANAGEMENT | Facility: CLINIC | Age: 74
End: 2024-05-29
Payer: COMMERCIAL

## 2025-05-16 ENCOUNTER — TRANSFERRED RECORDS (OUTPATIENT)
Dept: HEALTH INFORMATION MANAGEMENT | Facility: CLINIC | Age: 75
End: 2025-05-16
Payer: COMMERCIAL